# Patient Record
Sex: FEMALE | Race: WHITE | NOT HISPANIC OR LATINO | URBAN - METROPOLITAN AREA
[De-identification: names, ages, dates, MRNs, and addresses within clinical notes are randomized per-mention and may not be internally consistent; named-entity substitution may affect disease eponyms.]

---

## 2017-01-06 NOTE — PATIENT DISCUSSION
(H25.13) Age-related nuclear cataract, bilateral - Assesment : Examination revealed cataract visually significant OU. PATIENT WAS NEARSIGHTED PRIOR TO LASIK OU DISCUSSED WITH PATIENT DO NOT RECOMMEND EDOF/ TORIC IOLS SECONDARY TO H/O LASIK, ARMD INTERMEDIATE WHICH IS PROGRESSING AND DRYNESS. RECOMMEND ADDITIONAL TESTING BEFORE FINAL IOL DECISION AND FOCUS POINT.  MAY CONSIDER TORIC IOL AT TIME OF A-SCAN.  - Plan : Sx counseling

## 2017-01-06 NOTE — PATIENT DISCUSSION
(W71.115) Keratoconjunct sicca, not specified as Sjogren's, bilateral - Assesment : Examination revealed Dry Eye Syndrome,  OD:Inf punctate erosions, has improved since last visit H/O lasik OU - Plan : SYSTANE BALANCE OU TID  SYSTANE GEL OU QHS  STOP LOTEMAX GEL GTTS

## 2017-02-13 NOTE — PATIENT DISCUSSION
(H25.13) Age-related nuclear cataract, bilateral - Assesment : Examination revealed cataract visually significant OU. **HX of LASIK OU** Patient was nearsighted prior to [facility] OU Discussed with patient do not recommend ROF/Toric IOLs secondary to H/O LASIK, ARMD Intermediate which is progressing, and JULIUS. Significant astigmatism present, would warrant Toric lens OU. Recommend a single focus lens. Discussed focusing patient more at intermediate focus point. Advised patient this will give her more near vision, but will take away some acuity from distance. Discussed will edge OS in more for near. - Plan : Risks, Benefits and Alternatives were discussed with patient at length for Cataract Surgery. Visual symptoms are consistent with Cataract findings on examination and current refraction no longer provides satisfactory vision. Patient understands and desires surgery. All questions answered. Risks, Benefits and Alternatives discussed at length for IOL placement. Doctor suggests TP TORIC. Patient desires TP TORIC. Patient will need to wear glasses for sharpest distance and near.   EYE: OS IOL TYPE: TP TORIC POST OPERATIVE TARGET: -1.50 PACKAGE: TORIC OD to follow Patient to see surgery counselor today

## 2017-02-13 NOTE — PATIENT DISCUSSION
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  + FAMILY HISTORY OF ARMD (MOTHER)  OCT MAC performed today - Plan : Monitor for changes. Advised patient to call our office with decreased vision or increased distortion. Patient advised to check [employer] regularly (once weekly or more) and use nutraceuticals such as AREDS 2 eye vitamins. Wear sunglasses when outdoors and eat green, leafy vegetables to maintain ocular health. Optical coherence tomography performed.

## 2017-03-15 NOTE — PATIENT DISCUSSION
(Z96.1) Presence of intraocular lens - Assesment : Patient is Pseudophakic. ORA done in OR during surgery - Plan : Discussed signs and symptoms of infection and retinal detachments. Do not rub operated eye. Follow drop schedule If redness,pain,decreased vision, flashes or floaters occur then contact clinic.  RTC 1 week refraction/dilation

## 2017-03-20 NOTE — PATIENT DISCUSSION
(Z96.1) Presence of intraocular lens - Assesment : Patient is Pseudophakic. IOP within normal range today - Plan : Signs and symptoms of infection and retinal detachment are outlined in your surgical packet. Do not rub operated eye. Follow drop schedule. If redness, pain, decreased vision, flashes or floaters occur then contact clinic.  RTC 1 month post op

## 2017-03-20 NOTE — PATIENT DISCUSSION
(H25.11) Age-related nuclear cataract, right eye - Assesment : Examination revealed cataract visually significant OU. **HX of LASIK OU** Patient was nearsighted prior to [facility] OU Discussed with patient do not recommend ROF/Toric IOLs secondary to H/O LASIK, ARMD Intermediate which is progressing, and JULIUS. Significant astigmatism present, would warrant Toric lens OU. Recommend a single focus lens. Discussed focusing patient more at intermediate focus point. Advised patient this will give her more near vision, but will take away some acuity from distance. Discussed will edge OS in more for near. - Plan : Risks, Benefits and Alternatives were discussed with patient at length for Cataract Surgery. Visual symptoms are consistent with Cataract findings on examination and current refraction no longer provides satisfactory vision. Patient understands and desires surgery. All questions answered. Risks, Benefits and Alternatives discussed at length for IOL placement. Doctor suggests TP TORIC. Patient desires TP TORIC. Patient will need to wear glasses for sharpest distance and near.  EYE: OD IOL TYPE: TP TORIC POST OPERATIVE TARGET: -0.50 PACKAGE: TORIC Patient to see surgery counselor today

## 2017-03-29 NOTE — PATIENT DISCUSSION
(Z96.1) Presence of intraocular lens - Assesment : Patient is Pseudophakic. ORA was done in OR on operated eye. - Plan : Discussed signs and symptoms of infection and retinal detachments. Do not rub operated eye. Follow drop schedule If redness,pain,decreased vision, flashes or floaters occur then contact clinic.  RTC 1 week refraction/dilation

## 2017-04-04 NOTE — PATIENT DISCUSSION
(Z96.1) Presence of intraocular lens - Assesment : Patient is Pseudophakic. - Plan : Signs and symptoms of infection and retinal detachment are outlined in your surgical packet. Do not rub operated eye. Follow drop schedule. If redness, pain, decreased vision, flashes or floaters occur then contact clinic.  Continue artificial tears 2-3 times a day and gel qhs(can use during the day if feels very irritated) Rtc 3-4 weeks final post op

## 2017-05-04 NOTE — PATIENT DISCUSSION
(Z96.1) Presence of intraocular lens - Assesment : Patient is Pseudophakic. - Plan : Signs and symptoms of infection and retinal detachment are outlined in your surgical packet. Do not rub operated eye. Follow drop schedule. If redness, pain, decreased vision, flashes or floaters occur then contact clinic.  Rtc 6 months Exam/mac photos

## 2017-05-04 NOTE — PATIENT DISCUSSION
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  + FAMILY HISTORY OF ARMD (MOTHER)  OCT MAC performed today - Plan : Monitor for changes. Advised patient to call our office with decreased vision or increased distortion. Patient advised to check Amsler Grid regularly (once weekly or more) and use nutraceuticals such as AREDS 2 eye vitamins. Wear sunglasses when outdoors and eat green, leafy vegetables to maintain ocular health. Optical coherence tomography performed.

## 2017-11-02 NOTE — PATIENT DISCUSSION
(G26.870) Vitreous degeneration, bilateral - Assesment : Examination revealed PVD - Plan : Monitor for changes. Advised patient to call our office with decreased vision or an increase in flashes and/or floaters.

## 2017-11-02 NOTE — PATIENT DISCUSSION
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  + FAMILY HISTORY OF ARMD (MOTHER)  OCT MAC performed today - Plan : Monitor for changes. Advised patient to call our office with decreased vision or increased distortion. Patient advised to check Amsler Grid regularly (once weekly or more) and use nutraceuticals such as AREDS 2 eye vitamins. Wear sunglasses when outdoors and eat green, leafy vegetables to maintain ocular health. Keep scheduled follows up with  1 year Exam/MAC PHOTOS Optical coherence tomography performed.

## 2021-08-26 ENCOUNTER — PREPPED CHART (OUTPATIENT)
Dept: URBAN - METROPOLITAN AREA CLINIC 4 | Facility: CLINIC | Age: 66
End: 2021-08-26

## 2021-10-19 ASSESSMENT — TONOMETRY
OD_IOP_MMHG: 12
OS_IOP_MMHG: 11

## 2021-10-19 ASSESSMENT — VISUAL ACUITY
OS_SC: 20/40
OU_SC: 20/30
OD_GLARE: 20/60
OD_SC: 20/40
OS_GLARE: 20/200

## 2021-10-20 ENCOUNTER — 1 DAY POST-OP (OUTPATIENT)
Dept: URBAN - METROPOLITAN AREA CLINIC 4 | Facility: CLINIC | Age: 66
End: 2021-10-20

## 2021-10-20 DIAGNOSIS — Z96.1: ICD-10-CM

## 2021-10-20 PROCEDURE — 99024 POSTOP FOLLOW-UP VISIT: CPT

## 2021-10-20 ASSESSMENT — VISUAL ACUITY
OU_SC: J1-2
OD_SC: J3-2
OU_SC: 20/30-1
OD_SC: 20/30-1

## 2021-10-20 ASSESSMENT — TONOMETRY: OD_IOP_MMHG: 20

## 2021-11-01 ENCOUNTER — POST-OP CATARACT (OUTPATIENT)
Dept: URBAN - METROPOLITAN AREA CLINIC 4 | Facility: CLINIC | Age: 66
End: 2021-11-01

## 2021-11-01 DIAGNOSIS — Z96.1: ICD-10-CM

## 2021-11-01 PROCEDURE — 99024 POSTOP FOLLOW-UP VISIT: CPT

## 2021-11-01 ASSESSMENT — TONOMETRY
OD_IOP_MMHG: 17
OS_IOP_MMHG: 20

## 2021-11-01 ASSESSMENT — VISUAL ACUITY
OS_SC: J1+
OS_SC: 20/30-2
OD_SC: J1
OU_SC: 20/25+1
OU_SC: J1+
OD_SC: 20/25+1

## 2021-11-01 ASSESSMENT — KERATOMETRY
OD_K2POWER_DIOPTERS: 42.00
OS_K1POWER_DIOPTERS: 41.50
OD_AXISANGLE_DEGREES: 50
OS_K2POWER_DIOPTERS: 42.00
OS_AXISANGLE2_DEGREES: 141
OS_AXISANGLE_DEGREES: 51
OD_K1POWER_DIOPTERS: 41.75
OD_AXISANGLE2_DEGREES: 140

## 2022-10-14 NOTE — PATIENT DISCUSSION
Continue Artificial Tears: One drop to both eyes 3-4 times PRN. We recommend Systane, Refresh, Restasis, or Retaine lubricating eye drops which can be found at any pharmacy.